# Patient Record
Sex: MALE | Race: WHITE | NOT HISPANIC OR LATINO | Employment: UNEMPLOYED | URBAN - METROPOLITAN AREA
[De-identification: names, ages, dates, MRNs, and addresses within clinical notes are randomized per-mention and may not be internally consistent; named-entity substitution may affect disease eponyms.]

---

## 2023-04-25 ENCOUNTER — APPOINTMENT (OUTPATIENT)
Dept: PRIMARY CARE | Facility: CLINIC | Age: 38
End: 2023-04-25
Payer: COMMERCIAL

## 2023-05-03 ENCOUNTER — APPOINTMENT (OUTPATIENT)
Dept: LAB | Facility: LAB | Age: 38
End: 2023-05-03
Payer: COMMERCIAL

## 2023-05-03 ENCOUNTER — LAB (OUTPATIENT)
Dept: LAB | Facility: LAB | Age: 38
End: 2023-05-03
Payer: COMMERCIAL

## 2023-05-03 ENCOUNTER — OFFICE VISIT (OUTPATIENT)
Dept: PRIMARY CARE | Facility: CLINIC | Age: 38
End: 2023-05-03
Payer: COMMERCIAL

## 2023-05-03 VITALS
HEIGHT: 75 IN | HEART RATE: 83 BPM | SYSTOLIC BLOOD PRESSURE: 118 MMHG | WEIGHT: 148 LBS | BODY MASS INDEX: 18.4 KG/M2 | DIASTOLIC BLOOD PRESSURE: 78 MMHG | OXYGEN SATURATION: 96 %

## 2023-05-03 DIAGNOSIS — E55.9 VITAMIN D DEFICIENCY: ICD-10-CM

## 2023-05-03 DIAGNOSIS — Z00.00 WELLNESS EXAMINATION: ICD-10-CM

## 2023-05-03 DIAGNOSIS — E50.9 VITAMIN A DEFICIENCY: ICD-10-CM

## 2023-05-03 DIAGNOSIS — Z00.00 WELLNESS EXAMINATION: Primary | ICD-10-CM

## 2023-05-03 DIAGNOSIS — M54.41 CHRONIC BILATERAL LOW BACK PAIN WITH BILATERAL SCIATICA: ICD-10-CM

## 2023-05-03 DIAGNOSIS — G89.29 CHRONIC BILATERAL LOW BACK PAIN WITH BILATERAL SCIATICA: ICD-10-CM

## 2023-05-03 DIAGNOSIS — M54.42 CHRONIC BILATERAL LOW BACK PAIN WITH BILATERAL SCIATICA: ICD-10-CM

## 2023-05-03 PROBLEM — S29.011A INTERCOSTAL MUSCLE STRAIN: Status: ACTIVE | Noted: 2023-05-03

## 2023-05-03 LAB
ALANINE AMINOTRANSFERASE (SGPT) (U/L) IN SER/PLAS: 28 U/L (ref 10–52)
ALBUMIN (G/DL) IN SER/PLAS: 4.3 G/DL (ref 3.4–5)
ALKALINE PHOSPHATASE (U/L) IN SER/PLAS: 67 U/L (ref 33–120)
ANION GAP IN SER/PLAS: 11 MMOL/L (ref 10–20)
ASPARTATE AMINOTRANSFERASE (SGOT) (U/L) IN SER/PLAS: 29 U/L (ref 9–39)
BILIRUBIN TOTAL (MG/DL) IN SER/PLAS: 0.6 MG/DL (ref 0–1.2)
CALCIUM (MG/DL) IN SER/PLAS: 9 MG/DL (ref 8.6–10.3)
CARBON DIOXIDE, TOTAL (MMOL/L) IN SER/PLAS: 33 MMOL/L (ref 21–32)
CHLORIDE (MMOL/L) IN SER/PLAS: 103 MMOL/L (ref 98–107)
CHOLESTEROL (MG/DL) IN SER/PLAS: 101 MG/DL (ref 0–199)
CHOLESTEROL IN HDL (MG/DL) IN SER/PLAS: 46.4 MG/DL
CHOLESTEROL/HDL RATIO: 2.2
CREATININE (MG/DL) IN SER/PLAS: 0.99 MG/DL (ref 0.5–1.3)
ERYTHROCYTE DISTRIBUTION WIDTH (RATIO) BY AUTOMATED COUNT: 12.7 % (ref 11.5–14.5)
ERYTHROCYTE MEAN CORPUSCULAR HEMOGLOBIN CONCENTRATION (G/DL) BY AUTOMATED: 32.5 G/DL (ref 32–36)
ERYTHROCYTE MEAN CORPUSCULAR VOLUME (FL) BY AUTOMATED COUNT: 99 FL (ref 80–100)
ERYTHROCYTES (10*6/UL) IN BLOOD BY AUTOMATED COUNT: 5.02 X10E12/L (ref 4.5–5.9)
GFR MALE: >90 ML/MIN/1.73M2
GLUCOSE (MG/DL) IN SER/PLAS: 62 MG/DL (ref 74–99)
HEMATOCRIT (%) IN BLOOD BY AUTOMATED COUNT: 49.8 % (ref 41–52)
HEMOGLOBIN (G/DL) IN BLOOD: 16.2 G/DL (ref 13.5–17.5)
LEUKOCYTES (10*3/UL) IN BLOOD BY AUTOMATED COUNT: 7.1 X10E9/L (ref 4.4–11.3)
NON-HDL CHOLESTEROL: 55 MG/DL
PLATELETS (10*3/UL) IN BLOOD AUTOMATED COUNT: 163 X10E9/L (ref 150–450)
POTASSIUM (MMOL/L) IN SER/PLAS: 3.9 MMOL/L (ref 3.5–5.3)
PROTEIN TOTAL: 6.2 G/DL (ref 6.4–8.2)
SODIUM (MMOL/L) IN SER/PLAS: 143 MMOL/L (ref 136–145)
THYROTROPIN (MIU/L) IN SER/PLAS BY DETECTION LIMIT <= 0.05 MIU/L: 1.7 MIU/L (ref 0.44–3.98)
UREA NITROGEN (MG/DL) IN SER/PLAS: 17 MG/DL (ref 6–23)

## 2023-05-03 PROCEDURE — 1036F TOBACCO NON-USER: CPT

## 2023-05-03 PROCEDURE — 83718 ASSAY OF LIPOPROTEIN: CPT

## 2023-05-03 PROCEDURE — 84443 ASSAY THYROID STIM HORMONE: CPT

## 2023-05-03 PROCEDURE — 84153 ASSAY OF PSA TOTAL: CPT

## 2023-05-03 PROCEDURE — 80053 COMPREHEN METABOLIC PANEL: CPT

## 2023-05-03 PROCEDURE — 85027 COMPLETE CBC AUTOMATED: CPT

## 2023-05-03 PROCEDURE — 99386 PREV VISIT NEW AGE 40-64: CPT

## 2023-05-03 PROCEDURE — 82465 ASSAY BLD/SERUM CHOLESTEROL: CPT

## 2023-05-03 PROCEDURE — 99213 OFFICE O/P EST LOW 20 MIN: CPT

## 2023-05-03 PROCEDURE — 82306 VITAMIN D 25 HYDROXY: CPT

## 2023-05-03 PROCEDURE — 36415 COLL VENOUS BLD VENIPUNCTURE: CPT

## 2023-05-03 RX ORDER — MELOXICAM 15 MG/1
15 TABLET ORAL DAILY
Qty: 30 TABLET | Refills: 1 | Status: SHIPPED | OUTPATIENT
Start: 2023-05-03 | End: 2023-06-29

## 2023-05-03 RX ORDER — METHOCARBAMOL 500 MG/1
500 TABLET, FILM COATED ORAL 4 TIMES DAILY
Qty: 120 TABLET | Refills: 2 | Status: SHIPPED | OUTPATIENT
Start: 2023-05-03 | End: 2024-05-02

## 2023-05-03 RX ORDER — MELOXICAM 15 MG/1
15 TABLET ORAL DAILY
COMMUNITY
End: 2023-05-03 | Stop reason: SDUPTHER

## 2023-05-03 RX ORDER — METHOCARBAMOL 500 MG/1
500 TABLET, FILM COATED ORAL 4 TIMES DAILY
COMMUNITY
End: 2023-05-03 | Stop reason: SDUPTHER

## 2023-05-03 ASSESSMENT — PROMIS GLOBAL HEALTH SCALE
RATE_AVERAGE_PAIN: 4
CARRYOUT_SOCIAL_ACTIVITIES: VERY GOOD
EMOTIONAL_PROBLEMS: SOMETIMES
CARRYOUT_PHYSICAL_ACTIVITIES: COMPLETELY
RATE_GENERAL_HEALTH: GOOD
RATE_MENTAL_HEALTH: GOOD
RATE_PHYSICAL_HEALTH: GOOD
RATE_QUALITY_OF_LIFE: GOOD
RATE_AVERAGE_FATIGUE: MILD
RATE_SOCIAL_SATISFACTION: FAIR

## 2023-05-03 ASSESSMENT — ENCOUNTER SYMPTOMS
CHILLS: 0
SORE THROAT: 0
CONSTIPATION: 0
JOINT SWELLING: 0
ABDOMINAL PAIN: 0
HEADACHES: 0
SINUS PAIN: 0
MYALGIAS: 0
NECK PAIN: 0
WHEEZING: 0
EYE DISCHARGE: 0
BOWEL INCONTINENCE: 0
BACK PAIN: 1
HYPERACTIVE: 0
DIFFICULTY URINATING: 0
PALPITATIONS: 0
BLOOD IN STOOL: 0
AGITATION: 0
WEAKNESS: 0
SINUS PRESSURE: 0
EYE PAIN: 0
FEVER: 0
VOICE CHANGE: 0
NERVOUS/ANXIOUS: 0
WEIGHT LOSS: 0
TINGLING: 0
WOUND: 0
SHORTNESS OF BREATH: 0
DIARRHEA: 0
RHINORRHEA: 0
NUMBNESS: 0
PARESTHESIAS: 0
DYSURIA: 0
SPEECH DIFFICULTY: 0
FREQUENCY: 0
PERIANAL NUMBNESS: 0
LEG PAIN: 0
HEMATURIA: 0
PARESIS: 0
DIZZINESS: 0
VOMITING: 0
EYE ITCHING: 0
SLEEP DISTURBANCE: 0
COUGH: 0
CHEST TIGHTNESS: 0
FATIGUE: 0
DYSPHORIC MOOD: 0

## 2023-05-03 ASSESSMENT — PATIENT HEALTH QUESTIONNAIRE - PHQ9
SUM OF ALL RESPONSES TO PHQ9 QUESTIONS 1 AND 2: 0
2. FEELING DOWN, DEPRESSED OR HOPELESS: NOT AT ALL
1. LITTLE INTEREST OR PLEASURE IN DOING THINGS: NOT AT ALL

## 2023-05-03 NOTE — PROGRESS NOTES
Subjective   Patient ID: Brandin Spann is a 38 y.o. male who presents for Establish Care (Pt is here today to establish care,  states he injured his lower back two years ago and the pain is recurring was told it was arthritis and would like to discuss and look at x-rays ).    Subjective  Brandin Spann is a 38 y.o. male and is here for a comprehensive physical exam. The patient reports chronic back pain.    Do you take any herbs or supplements that were not prescribed by a doctor? no  Are you taking calcium supplements? no  Are you taking aspirin daily? no       1. Well visits  2. Patient Counseling:  --Nutrition: Stressed importance of moderation in sodium/caffeine intake, saturated fat and cholesterol, caloric balance, sufficient intake of fresh fruits, vegetables, fiber, calcium, iron, and 1 mg of folate supplement per day (for females capable of pregnancy).  --Discussed the issue of estrogen replacement, calcium supplement, and the daily use of baby aspirin.  --Exercise: Stressed the importance of regular exercise.   --Substance Abuse: Discussed cessation/primary prevention of tobacco, alcohol, or other drug use; driving or other dangerous activities under the influence; availability of treatment for abuse.    --Sexuality: Discussed sexually transmitted diseases, partner selection, use of condoms, avoidance of unintended pregnancy  and contraceptive alternatives.   --Injury prevention: Discussed safety belts, safety helmets, smoke detector, smoking near bedding or upholstery.   --Dental health: Discussed importance of regular tooth brushing, flossing, and dental visits.  --Immunizations reviewed.  --Discussed benefits of screening colonoscopy.  --After hours service discussed with patient  3. Discussed the patient's BMI with him.  The BMI is in the acceptable range.  4. Follow up as needed for acute illness      Back Pain  This is a chronic (pt fell on ice and has disk with xrays from original ED visit with  him.  Has seen spinal specialist but did not feel they were affective in communicating the treatment plan.  Pt now has better understanding. will continue tx plan) problem. The current episode started more than 1 month ago. The problem occurs intermittently. The problem has been waxing and waning since onset. The pain is present in the lumbar spine. The quality of the pain is described as aching. The pain is at a severity of 7/10. The pain is moderate. The pain is Worse during the night. Pertinent negatives include no abdominal pain, bladder incontinence, bowel incontinence, chest pain, dysuria, fever, headaches, leg pain, numbness, paresis, paresthesias, pelvic pain, perianal numbness, tingling, weakness or weight loss. Risk factors include recent trauma. He has tried muscle relaxant and NSAIDs for the symptoms. The treatment provided moderate relief.        Review of Systems   Constitutional:  Negative for chills, fatigue, fever and weight loss.   HENT:  Negative for congestion, ear discharge, ear pain, nosebleeds, postnasal drip, rhinorrhea, sinus pressure, sinus pain, sore throat, tinnitus and voice change.    Eyes:  Negative for pain, discharge and itching.   Respiratory:  Negative for cough, chest tightness, shortness of breath and wheezing.    Cardiovascular:  Negative for chest pain, palpitations and leg swelling.   Gastrointestinal:  Negative for abdominal pain, blood in stool, bowel incontinence, constipation, diarrhea and vomiting.   Endocrine: Negative for cold intolerance, heat intolerance and polyuria.   Genitourinary:  Negative for bladder incontinence, difficulty urinating, dysuria, frequency, hematuria, pelvic pain, penile pain, penile swelling, scrotal swelling, testicular pain and urgency.   Musculoskeletal:  Positive for back pain. Negative for gait problem, joint swelling, myalgias and neck pain.   Skin:  Negative for rash and wound.   Neurological:  Negative for dizziness, tingling, speech  "difficulty, weakness, numbness, headaches and paresthesias.   Psychiatric/Behavioral:  Negative for agitation, dysphoric mood and sleep disturbance. The patient is not nervous/anxious and is not hyperactive.        Objective   /78   Pulse 83   Ht 1.905 m (6' 3\")   Wt 67.1 kg (148 lb)   SpO2 96%   BMI 18.50 kg/m²     Physical Exam  Vitals and nursing note reviewed.   Constitutional:       Appearance: Normal appearance.   HENT:      Head: Normocephalic and atraumatic.      Right Ear: Tympanic membrane and external ear normal.      Left Ear: Tympanic membrane and external ear normal.      Nose: Nose normal.      Mouth/Throat:      Mouth: Mucous membranes are moist.      Pharynx: Oropharynx is clear. Uvula midline.   Eyes:      General: Lids are normal.      Extraocular Movements: Extraocular movements intact.      Pupils: Pupils are equal, round, and reactive to light.   Neck:      Thyroid: No thyromegaly or thyroid tenderness.   Cardiovascular:      Rate and Rhythm: Normal rate and regular rhythm.      Heart sounds: Normal heart sounds.   Pulmonary:      Effort: Pulmonary effort is normal.      Breath sounds: Normal breath sounds.   Abdominal:      General: Bowel sounds are normal.      Palpations: Abdomen is soft.      Tenderness: There is no abdominal tenderness. There is no guarding.   Musculoskeletal:         General: No swelling. Normal range of motion.      Cervical back: Normal range of motion.      Right lower leg: No edema.      Left lower leg: No edema.   Lymphadenopathy:      Head:      Right side of head: No submental, submandibular or tonsillar adenopathy.      Left side of head: No submental, submandibular or tonsillar adenopathy.      Cervical: No cervical adenopathy.   Skin:     General: Skin is warm and dry.      Capillary Refill: Capillary refill takes less than 2 seconds.      Coloration: Skin is not jaundiced.      Findings: No lesion or rash.   Neurological:      General: No focal " deficit present.      Mental Status: He is alert and oriented to person, place, and time.   Psychiatric:         Mood and Affect: Mood normal.         Behavior: Behavior normal.         Thought Content: Thought content normal.         Judgment: Judgment normal.         Assessment/Plan   Problem List Items Addressed This Visit          Other    Chronic bilateral low back pain with bilateral sciatica - Primary    Relevant Medications    methocarbamol (Robaxin) 500 mg tablet    meloxicam (Mobic) 15 mg tablet    Other Relevant Orders    Referral to Physical Therapy     Other Visit Diagnoses       Wellness examination        Relevant Orders    Comprehensive Metabolic Panel    CBC    TSH with reflex to Free T4 if abnormal    Lipid Panel Non-Fasting    Vitamin A deficiency        Relevant Orders    Vitamin D, Total

## 2023-05-03 NOTE — PATIENT INSTRUCTIONS
Please get fasting labs done in the next few DAYS (nothing to eat or drink for 10 hours prior to blood draw EXCEPT black coffee and water), we will call you with the results. If you do not hear from up 2-3 business days after you had your labs drawn, please call the office at [phone]    Vaccines are important!  Yearly seasonal flu shots are recommended, high dose is indicated for patient over 65.   - Tetanus boosters should be given every 10 yrs, this also covers for diphtheria and pertussis.   - most insurances cover the 2-shot series for shingles (shingrix) after the age of 60.   - Pneumonia vaccines are given routinely at age 65, however is you have a chronic heart or lung diagnosis this may be given earlier.     Preventative cancer screens SAVE LIVES!  - Prostate cancer screening is included in yearly blood work in men over 40.   - Colon cancer screening is recommended at age  for all patients, sometimes sooner based on family history.   - other tests may be recommended if you are a smoker!     150 mins of aerobic exercise is advised for good cardiovascular health and maintain a healthy weight.     Please try to work on maintaining a healthy body weight, with a BMI close to or at a BMI 19-25.    Recommend a whole-foods, plant-based diet, filled with fresh fruits, vegetables, seeds, beans, nuts and berries.    Discussed smoking cessation/Abstinence is best.      Abstaining from the use of alcohol is best. However if you choose to drink current guidelines are 2 or less drinks per day for men and 1.5 or less per day for women (and not all saved for one night on the weekend).    Your blood pressure should be BELOW 135/85.  If your readings were high today, please consider an at home blood pressure monitor to keep a log to bring with you to your next appointment.     OF course, do not text and drive and always wear a seat belt.

## 2023-05-04 LAB
CALCIDIOL (25 OH VITAMIN D3) (NG/ML) IN SER/PLAS: 36 NG/ML
PROSTATE SPECIFIC ANTIGEN,SCREEN: 0.52 NG/ML (ref 0–4)

## 2024-08-13 ENCOUNTER — APPOINTMENT (OUTPATIENT)
Dept: PRIMARY CARE | Facility: CLINIC | Age: 39
End: 2024-08-13
Payer: COMMERCIAL

## 2024-08-13 VITALS
DIASTOLIC BLOOD PRESSURE: 75 MMHG | HEIGHT: 75 IN | SYSTOLIC BLOOD PRESSURE: 124 MMHG | WEIGHT: 150 LBS | BODY MASS INDEX: 18.65 KG/M2 | HEART RATE: 77 BPM | OXYGEN SATURATION: 98 %

## 2024-08-13 DIAGNOSIS — Z02.89 MEDICATION MANAGEMENT CONTRACT SIGNED: ICD-10-CM

## 2024-08-13 DIAGNOSIS — F90.0 ADHD, PREDOMINANTLY INATTENTIVE TYPE: Primary | ICD-10-CM

## 2024-08-13 DIAGNOSIS — N52.9 VASCULOGENIC ERECTILE DYSFUNCTION, UNSPECIFIED VASCULOGENIC ERECTILE DYSFUNCTION TYPE: ICD-10-CM

## 2024-08-13 LAB
POC AMPHETAMINE: NEGATIVE NG/ML
POC BARBITURATES: NEGATIVE NG/ML
POC BENZODIAZEPINES: NEGATIVE NG/ML
POC BUPRENORPHINE URINE: NEGATIVE NG/ML
POC COCAINE: NEGATIVE NG/ML
POC MDMA (NG/ML) IN URINE: NEGATIVE NG/ML
POC METHADONE MANUALLY ENTERED: NEGATIVE NG/ML
POC METHAMPHETAMINE: NEGATIVE NG/ML
POC MORPHINE URINE: NEGATIVE NG/ML
POC OPIATES: NEGATIVE NG/ML
POC OXYCODONE: NEGATIVE NG/ML
POC PHENCYCLIDINE (PCP): NEGATIVE NG/ML
POC THC: NEGATIVE NG/ML
POC TICYCLIC ANTIDEPRESSANTS (TCA): NEGATIVE NG/ML

## 2024-08-13 PROCEDURE — 80305 DRUG TEST PRSMV DIR OPT OBS: CPT

## 2024-08-13 PROCEDURE — 3008F BODY MASS INDEX DOCD: CPT

## 2024-08-13 PROCEDURE — 99214 OFFICE O/P EST MOD 30 MIN: CPT

## 2024-08-13 PROCEDURE — 1036F TOBACCO NON-USER: CPT

## 2024-08-13 RX ORDER — TADALAFIL 10 MG/1
10 TABLET ORAL DAILY PRN
Qty: 12 TABLET | Refills: 3 | Status: SHIPPED | OUTPATIENT
Start: 2024-08-13 | End: 2025-08-13

## 2024-08-13 RX ORDER — DEXTROAMPHETAMINE SACCHARATE, AMPHETAMINE ASPARTATE MONOHYDRATE, DEXTROAMPHETAMINE SULFATE AND AMPHETAMINE SULFATE 5; 5; 5; 5 MG/1; MG/1; MG/1; MG/1
20 CAPSULE, EXTENDED RELEASE ORAL EVERY MORNING
Qty: 30 CAPSULE | Refills: 0 | Status: SHIPPED | OUTPATIENT
Start: 2024-10-12 | End: 2024-11-10

## 2024-08-13 RX ORDER — DEXTROAMPHETAMINE SACCHARATE, AMPHETAMINE ASPARTATE MONOHYDRATE, DEXTROAMPHETAMINE SULFATE AND AMPHETAMINE SULFATE 2.5; 2.5; 2.5; 2.5 MG/1; MG/1; MG/1; MG/1
10 CAPSULE, EXTENDED RELEASE ORAL EVERY MORNING
Qty: 30 CAPSULE | Refills: 0 | Status: SHIPPED | OUTPATIENT
Start: 2024-08-13 | End: 2024-09-12

## 2024-08-13 RX ORDER — DEXTROAMPHETAMINE SACCHARATE, AMPHETAMINE ASPARTATE MONOHYDRATE, DEXTROAMPHETAMINE SULFATE AND AMPHETAMINE SULFATE 5; 5; 5; 5 MG/1; MG/1; MG/1; MG/1
20 CAPSULE, EXTENDED RELEASE ORAL EVERY MORNING
Qty: 30 CAPSULE | Refills: 0 | Status: SHIPPED | OUTPATIENT
Start: 2024-08-13 | End: 2024-08-13 | Stop reason: DRUGHIGH

## 2024-08-13 RX ORDER — DEXTROAMPHETAMINE SACCHARATE, AMPHETAMINE ASPARTATE MONOHYDRATE, DEXTROAMPHETAMINE SULFATE AND AMPHETAMINE SULFATE 5; 5; 5; 5 MG/1; MG/1; MG/1; MG/1
20 CAPSULE, EXTENDED RELEASE ORAL EVERY MORNING
Qty: 30 CAPSULE | Refills: 0 | Status: SHIPPED | OUTPATIENT
Start: 2024-09-12 | End: 2024-10-11

## 2024-08-13 ASSESSMENT — ENCOUNTER SYMPTOMS
HEMATURIA: 0
NERVOUS/ANXIOUS: 0
FREQUENCY: 0
DYSURIA: 0
DECREASED LIBIDO: 0

## 2024-08-13 NOTE — PROGRESS NOTES
Subjective   Patient ID: Brandin Spann is a 39 y.o. male who presents for Follow-up (pt to go back on his meds for adhd and would like to discuss meds for erectile dysfunction).    OARRS:  No data recorded  I have personally reviewed the OARRS report for Brandin Spann. I have considered the risks of abuse, dependence, addiction and diversion and I believe that it is clinically appropriate for Brandin Spann to be prescribed this medication    Is the patient prescribed a combination of a benzodiazepine and opioid?  No    Last Urine Drug Screen / ordered today: Yes Date of Last UDS: 8/13/24  Results are as expected.     Controlled Substance Agreement:  Date of the Last Agreement: 8/13/24  Date of the Last fill: n/a new script  Reviewed Controlled Substance Agreement including but not limited to the benefits, risks, and alternatives to treatment with a Controlled Substance medication(s).      ADHD  This is a chronic problem. The current episode started more than 1 year ago (ritalin starting in approx 3rd grade). The problem occurs daily. The problem has been gradually worsening. Associated symptoms comments: Hard to focus, not completing tasks. The symptoms are aggravated by stress. Treatments tried: vyvanse and adderall while he was in [place].   Erectile Dysfunction  This is a chronic problem. The current episode started more than 1 month ago. The problem has been gradually worsening since onset. The nature of his difficulty is achieving erection. Non-physiologic factors contributing to erectile dysfunction are performance anxiety. He reports no anxiety or decreased libido. He reports his erection duration to be 5 to 10 minutes. Irritative symptoms include nocturia. Irritative symptoms do not include frequency or urgency. Obstructive symptoms do not include dribbling, incomplete emptying, an intermittent stream, a slower stream, straining or a weak stream. Pertinent negatives include no dysuria, genital pain,  "hematuria, hesitancy or inability to urinate. Nothing aggravates the symptoms. Treatments tried: blue chew. The treatment provided no relief. Risk factors include diabetes mellitus, penile trauma, penile curvature, prostate surgery and nerve injury.        Review of Systems   Genitourinary:  Positive for nocturia. Negative for decreased libido, dysuria, frequency, hematuria, hesitancy, incomplete emptying and urgency.   Psychiatric/Behavioral:  The patient is not nervous/anxious.        Objective   /75   Pulse 77   Ht 1.905 m (6' 3\")   Wt 68 kg (150 lb)   SpO2 98%   BMI 18.75 kg/m²     Physical Exam  Vitals reviewed.   Constitutional:       General: He is not in acute distress.     Appearance: Normal appearance. He is not ill-appearing.   Cardiovascular:      Heart sounds: Normal heart sounds.   Pulmonary:      Breath sounds: Normal breath sounds.   Abdominal:      General: Bowel sounds are normal.      Palpations: Abdomen is soft.   Neurological:      Mental Status: He is alert.   Psychiatric:         Mood and Affect: Mood normal.         Behavior: Behavior normal.         Assessment/Plan   Brandin was seen today for follow-up.  Diagnoses and all orders for this visit:  ADHD, predominantly inattentive type  -     amphetamine-dextroamphetamine XR (Adderall XR) 20 mg 24 hr capsule; Take 1 capsule (20 mg) by mouth once daily in the morning. Do not crush or chew.  -     amphetamine-dextroamphetamine XR (Adderall XR) 20 mg 24 hr capsule; Take 1 capsule (20 mg) by mouth once daily in the morning. Do not crush or chew. Do not fill before September 12, 2024.  -     amphetamine-dextroamphetamine XR (Adderall XR) 20 mg 24 hr capsule; Take 1 capsule (20 mg) by mouth once daily in the morning. Do not crush or chew. Do not fill before October 12, 2024.  Medication management contract signed  -     POCT waived urine drug screen manually resulted  Vasculogenic erectile dysfunction, unspecified vasculogenic erectile " dysfunction type  -     tadalafil (Cialis) 10 mg tablet; Take 1 tablet (10 mg) by mouth once daily as needed for erectile dysfunction.    1st month of adderal changed to 10mg due to availability pt aware of change.

## 2024-09-19 ENCOUNTER — TELEPHONE (OUTPATIENT)
Dept: PRIMARY CARE | Facility: CLINIC | Age: 39
End: 2024-09-19
Payer: COMMERCIAL

## 2024-09-19 DIAGNOSIS — F90.0 ADHD, PREDOMINANTLY INATTENTIVE TYPE: ICD-10-CM

## 2024-09-19 RX ORDER — DEXTROAMPHETAMINE SACCHARATE, AMPHETAMINE ASPARTATE MONOHYDRATE, DEXTROAMPHETAMINE SULFATE AND AMPHETAMINE SULFATE 2.5; 2.5; 2.5; 2.5 MG/1; MG/1; MG/1; MG/1
20 CAPSULE, EXTENDED RELEASE ORAL EVERY MORNING
Qty: 60 CAPSULE | Refills: 0 | Status: SHIPPED | OUTPATIENT
Start: 2024-09-19 | End: 2024-09-19 | Stop reason: SDUPTHER

## 2024-09-19 RX ORDER — DEXTROAMPHETAMINE SACCHARATE, AMPHETAMINE ASPARTATE MONOHYDRATE, DEXTROAMPHETAMINE SULFATE AND AMPHETAMINE SULFATE 2.5; 2.5; 2.5; 2.5 MG/1; MG/1; MG/1; MG/1
20 CAPSULE, EXTENDED RELEASE ORAL EVERY MORNING
Qty: 60 CAPSULE | Refills: 0 | Status: SHIPPED | OUTPATIENT
Start: 2024-09-19 | End: 2024-10-19

## 2024-09-19 NOTE — TELEPHONE ENCOUNTER
Can we call him and let him know I sent script for 10mg ER and to take 2 per day as you can just take 20 of the non extended they do not stay in the system the same. If there is still availability issues we can go to the regular 10mg twice per day once in am and once at lunch time

## 2024-09-19 NOTE — TELEPHONE ENCOUNTER
Patient called in stating he has called 2x and hasn't heard back. States his adderall 20mg time release is on pack order, has called several pharmacies and all say the same thing. Was wondering if you could prescribe him the standard 20mg instead. States he was suppose get script refill 9/11 and is now out of medication.

## 2024-10-16 ENCOUNTER — TELEPHONE (OUTPATIENT)
Dept: PRIMARY CARE | Facility: CLINIC | Age: 39
End: 2024-10-16
Payer: COMMERCIAL

## 2024-10-16 NOTE — TELEPHONE ENCOUNTER
Patients insurance wont cover 10 mg 2 times a day. Would like to switch to 20mg once a day if possible.    Last office visit: 8/13/2024  Next office visit: 2/11/2025

## 2025-01-23 DIAGNOSIS — F90.0 ADHD, PREDOMINANTLY INATTENTIVE TYPE: ICD-10-CM

## 2025-01-23 RX ORDER — DEXTROAMPHETAMINE SACCHARATE, AMPHETAMINE ASPARTATE MONOHYDRATE, DEXTROAMPHETAMINE SULFATE AND AMPHETAMINE SULFATE 5; 5; 5; 5 MG/1; MG/1; MG/1; MG/1
20 CAPSULE, EXTENDED RELEASE ORAL EVERY MORNING
Qty: 30 CAPSULE | Refills: 0 | Status: SHIPPED | OUTPATIENT
Start: 2025-01-23 | End: 2025-02-21

## 2025-01-23 NOTE — TELEPHONE ENCOUNTER
OARRS:  No data recorded  I have personally reviewed the OARRS report for Brandin Spann. I have considered the risks of abuse, dependence, addiction and diversion and I believe that it is clinically appropriate for Brandin Spann to be prescribed this medication    Is the patient prescribed a combination of a benzodiazepine and opioid?  No    Last Urine Drug Screen / ordered today: No Date of Last UDS: 8/2024  Results are as expected.     Controlled Substance Agreement:  Date of the Last Agreement: 8/2024  Date of the Last fill: 12/26/24  Reviewed Controlled Substance Agreement including but not limited to the benefits, risks, and alternatives to treatment with a Controlled Substance medication(s).    Stimulants:   What is the patient's goal of therapy? Control of adhd  Is this being achieved with current treatment? yes

## 2025-02-11 ENCOUNTER — APPOINTMENT (OUTPATIENT)
Dept: PRIMARY CARE | Facility: CLINIC | Age: 40
End: 2025-02-11
Payer: COMMERCIAL

## 2025-02-11 VITALS
HEIGHT: 75 IN | BODY MASS INDEX: 19.27 KG/M2 | SYSTOLIC BLOOD PRESSURE: 134 MMHG | OXYGEN SATURATION: 99 % | HEART RATE: 75 BPM | WEIGHT: 155 LBS | DIASTOLIC BLOOD PRESSURE: 80 MMHG

## 2025-02-11 DIAGNOSIS — Z02.89 MEDICATION MANAGEMENT CONTRACT SIGNED: ICD-10-CM

## 2025-02-11 DIAGNOSIS — F90.0 ADHD, PREDOMINANTLY INATTENTIVE TYPE: Primary | ICD-10-CM

## 2025-02-11 PROCEDURE — 1036F TOBACCO NON-USER: CPT

## 2025-02-11 PROCEDURE — 99213 OFFICE O/P EST LOW 20 MIN: CPT

## 2025-02-11 PROCEDURE — 3008F BODY MASS INDEX DOCD: CPT

## 2025-02-11 RX ORDER — DEXTROAMPHETAMINE SACCHARATE, AMPHETAMINE ASPARTATE, DEXTROAMPHETAMINE SULFATE AND AMPHETAMINE SULFATE 5; 5; 5; 5 MG/1; MG/1; MG/1; MG/1
20 TABLET ORAL DAILY
Qty: 30 TABLET | Refills: 0 | Status: SHIPPED | OUTPATIENT
Start: 2025-03-13 | End: 2025-04-12

## 2025-02-11 RX ORDER — DEXTROAMPHETAMINE SACCHARATE, AMPHETAMINE ASPARTATE, DEXTROAMPHETAMINE SULFATE AND AMPHETAMINE SULFATE 5; 5; 5; 5 MG/1; MG/1; MG/1; MG/1
20 TABLET ORAL DAILY
Qty: 30 TABLET | Refills: 0 | Status: SHIPPED | OUTPATIENT
Start: 2025-02-11 | End: 2025-03-13

## 2025-02-11 RX ORDER — DEXTROAMPHETAMINE SACCHARATE, AMPHETAMINE ASPARTATE, DEXTROAMPHETAMINE SULFATE AND AMPHETAMINE SULFATE 5; 5; 5; 5 MG/1; MG/1; MG/1; MG/1
20 TABLET ORAL DAILY
Qty: 30 TABLET | Refills: 0 | Status: SHIPPED | OUTPATIENT
Start: 2025-04-12 | End: 2025-05-12

## 2025-02-11 ASSESSMENT — PATIENT HEALTH QUESTIONNAIRE - PHQ9
SUM OF ALL RESPONSES TO PHQ9 QUESTIONS 1 AND 2: 0
1. LITTLE INTEREST OR PLEASURE IN DOING THINGS: NOT AT ALL
2. FEELING DOWN, DEPRESSED OR HOPELESS: NOT AT ALL

## 2025-02-11 NOTE — PROGRESS NOTES
"Subjective   Patient ID: Brandin Spann is a 39 y.o. male who presents for Follow-up (6 month FUV for Controlled/UDS 8/13/24/CSA 8/13/24).    OARRS:  No data recorded  I have personally reviewed the OARRS report for Brandin Spann. I have considered the risks of abuse, dependence, addiction and diversion and I believe that it is clinically appropriate for Brandin Spann to be prescribed this medication    Is the patient prescribed a combination of a benzodiazepine and opioid?  No    Last Urine Drug Screen / ordered today: No Date of Last UDS: 8/13/24  Results are as expected.     Controlled Substance Agreement:  Date of the Last Agreement: 8/13/24  Date of the Last fill: 1/24/25  Reviewed Controlled Substance Agreement including but not limited to the benefits, risks, and alternatives to treatment with a Controlled Substance medication(s).    Stimulants:   What is the patient's goal of therapy? Concentrate better  Is this being achieved with current treatment? yes    Activities of Daily Living:   Is your overall impression that this patient is benefiting (symptom reduction outweighs side effects) from stimulant therapy? Yes     1. Physical Functioning: Better  2. Family Relationship: Better  3. Social Relationship: Better  4. Mood: Better  5. Sleep Patterns: feels xr is affecting his sleep, would like to change to immediate release so that med is done when work day is over.  6. Overall Function: Better            ADHD  This is a chronic problem. The current episode started more than 1 month ago. The problem occurs daily. The problem has been unchanged (well controlled with current medication). Nothing aggravates the symptoms. Treatments tried: stimmulant extended release. The treatment provided moderate relief.        Review of Systems    Objective   /80   Pulse 75   Ht 1.905 m (6' 3\")   Wt 70.3 kg (155 lb)   SpO2 99%   BMI 19.37 kg/m²     Physical Exam  Vitals reviewed.   Constitutional:       " General: He is not in acute distress.     Appearance: Normal appearance. He is not ill-appearing.   Cardiovascular:      Heart sounds: Normal heart sounds.   Pulmonary:      Effort: Pulmonary effort is normal.      Breath sounds: Normal breath sounds.   Neurological:      Mental Status: He is alert and oriented to person, place, and time.         Assessment/Plan   Brandin was seen today for follow-up.  Diagnoses and all orders for this visit:  ADHD, predominantly inattentive type  -     amphetamine-dextroamphetamine (Adderall) 20 mg tablet; Take 1 tablet (20 mg) by mouth once daily.  -     amphetamine-dextroamphetamine (Adderall) 20 mg tablet; Take 1 tablet (20 mg) by mouth once daily. Do not fill before March 13, 2025.  -     amphetamine-dextroamphetamine (Adderall) 20 mg tablet; Take 1 tablet (20 mg) by mouth once daily. Do not fill before April 12, 2025.  Medication management contract signed

## 2025-03-12 DIAGNOSIS — F90.0 ADHD, PREDOMINANTLY INATTENTIVE TYPE: ICD-10-CM

## 2025-03-12 RX ORDER — DEXTROAMPHETAMINE SACCHARATE, AMPHETAMINE ASPARTATE, DEXTROAMPHETAMINE SULFATE AND AMPHETAMINE SULFATE 5; 5; 5; 5 MG/1; MG/1; MG/1; MG/1
20 TABLET ORAL DAILY
Qty: 30 TABLET | Refills: 0 | Status: CANCELLED | OUTPATIENT
Start: 2025-03-12 | End: 2025-04-11

## 2025-05-16 DIAGNOSIS — F90.0 ADHD, PREDOMINANTLY INATTENTIVE TYPE: ICD-10-CM

## 2025-05-16 RX ORDER — DEXTROAMPHETAMINE SACCHARATE, AMPHETAMINE ASPARTATE, DEXTROAMPHETAMINE SULFATE AND AMPHETAMINE SULFATE 5; 5; 5; 5 MG/1; MG/1; MG/1; MG/1
20 TABLET ORAL DAILY
Qty: 30 TABLET | Refills: 0 | Status: SHIPPED | OUTPATIENT
Start: 2025-05-16 | End: 2025-06-15

## 2025-05-16 NOTE — TELEPHONE ENCOUNTER
Last office visit: 2/11/2025  Next office visit: 8/13/2025  UDS 8/13/24  CSA 8/13/24  Fill 4/13/25

## 2025-05-31 ENCOUNTER — PATIENT MESSAGE (OUTPATIENT)
Dept: PRIMARY CARE | Facility: CLINIC | Age: 40
End: 2025-05-31
Payer: COMMERCIAL

## 2025-05-31 DIAGNOSIS — K58.2 IRRITABLE BOWEL SYNDROME WITH BOTH CONSTIPATION AND DIARRHEA: Primary | ICD-10-CM

## 2025-06-03 RX ORDER — MELOXICAM 15 MG/1
15 TABLET ORAL DAILY
Qty: 30 TABLET | Refills: 11 | Status: SHIPPED | OUTPATIENT
Start: 2025-06-03 | End: 2026-06-03

## 2025-06-12 ENCOUNTER — OFFICE VISIT (OUTPATIENT)
Dept: PRIMARY CARE | Facility: CLINIC | Age: 40
End: 2025-06-12
Payer: COMMERCIAL

## 2025-06-12 VITALS
OXYGEN SATURATION: 96 % | WEIGHT: 152 LBS | HEART RATE: 75 BPM | BODY MASS INDEX: 18.9 KG/M2 | DIASTOLIC BLOOD PRESSURE: 80 MMHG | SYSTOLIC BLOOD PRESSURE: 116 MMHG | HEIGHT: 75 IN
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
## 2025-06-12 DIAGNOSIS — E86.0 DEHYDRATION: ICD-10-CM
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
## 2025-06-12 PROCEDURE — 1036F TOBACCO NON-USER: CPT

## 2025-06-12 PROCEDURE — 3008F BODY MASS INDEX DOCD: CPT

## 2025-06-12 PROCEDURE — 99213 OFFICE O/P EST LOW 20 MIN: CPT

## 2025-06-12 ASSESSMENT — ENCOUNTER SYMPTOMS
LOSS OF CONSCIOUSNESS: 1
FEVER: 0
NUMBNESS: 0
NAUSEA: 1
VOMITING: 0
VISUAL CHANGE: 0
BOWEL INCONTINENCE: 0
HEADACHES: 0
ABDOMINAL PAIN: 0

## 2025-06-12 ASSESSMENT — PATIENT HEALTH QUESTIONNAIRE - PHQ9
1. LITTLE INTEREST OR PLEASURE IN DOING THINGS: NOT AT ALL
2. FEELING DOWN, DEPRESSED OR HOPELESS: NOT AT ALL
SUM OF ALL RESPONSES TO PHQ9 QUESTIONS 1 AND 2: 0

## 2025-06-12 ASSESSMENT — VISUAL ACUITY: OU: 1

## 2025-06-12 NOTE — PROGRESS NOTES
"Subjective   Patient ID: Brandin Spann is a 40 y.o. male who presents for Fall (BP went low and fell at home in bathroom last night, got hot, sweaty, was nauseous, wife found him on the floor, hit his head, has cuts in his face, Left eye was red /Readings at home right after blacking out and falling/1st-90/55/2nd-89/53/3rd-96/62/4th-98/56 (15 min after eating scrambled eggs)/Wants to know if vaping affects his vaping/Only had one bottle of water all day, outside doing yard work all day, had two beers).    Fall  Incident onset: yesterday evening. Fall occurred: while returning from the bathroom fell in bedroom, got lightheaded and next thing he knew his wife was trying to wake him. He landed on Carpet (maybe the end of the bed). The point of impact was the face. The pain is present in the neck. The pain is at a severity of 2/10. The pain is mild. Associated symptoms include a loss of consciousness and nausea. Pertinent negatives include no abdominal pain, bowel incontinence, fever, headaches, hearing loss, numbness, visual change or vomiting. He has tried rest for the symptoms. The treatment provided significant relief.        Review of Systems   Constitutional:  Negative for fever.   Gastrointestinal:  Positive for nausea. Negative for abdominal pain, bowel incontinence and vomiting.   Neurological:  Positive for loss of consciousness. Negative for numbness and headaches.       Objective   /80   Pulse 75   Ht 1.905 m (6' 3\")   Wt 68.9 kg (152 lb)   SpO2 96%   BMI 19.00 kg/m²     Physical Exam  Constitutional:       General: He is not in acute distress.     Appearance: Normal appearance. He is not ill-appearing.   HENT:      Head: Laceration present. No raccoon eyes, Lerner's sign, contusion, right periorbital erythema or left periorbital erythema.     Eyes:      General: Vision grossly intact. Gaze aligned appropriately. No visual field deficit.     Extraocular Movements: Extraocular movements intact. "      Right eye: Normal extraocular motion and no nystagmus.      Left eye: Normal extraocular motion and no nystagmus.   Skin:     Findings: Wound present.   Neurological:      Mental Status: He is alert.      Cranial Nerves: No dysarthria or facial asymmetry.      Motor: No weakness or tremor.      Coordination: Coordination normal. Finger-Nose-Finger Test normal.      Gait: Gait normal.         Assessment/Plan   Brandin was seen today for fall.  Diagnoses and all orders for this visit:  Fall, initial encounter  Dehydration  Comments:  educated on need to ensure proper hydration due to working out in the elements, addition of electroylte packets with every other water  Moderate sun exposure, initial encounter

## 2025-06-26 ENCOUNTER — PATIENT MESSAGE (OUTPATIENT)
Dept: PRIMARY CARE | Facility: CLINIC | Age: 40
End: 2025-06-26

## 2025-06-26 DIAGNOSIS — F90.0 ADHD, PREDOMINANTLY INATTENTIVE TYPE: ICD-10-CM

## 2025-06-27 RX ORDER — DEXTROAMPHETAMINE SACCHARATE, AMPHETAMINE ASPARTATE, DEXTROAMPHETAMINE SULFATE AND AMPHETAMINE SULFATE 5; 5; 5; 5 MG/1; MG/1; MG/1; MG/1
20 TABLET ORAL DAILY
Qty: 30 TABLET | Refills: 0 | Status: SHIPPED | OUTPATIENT
Start: 2025-06-27 | End: 2025-07-27

## 2025-07-29 DIAGNOSIS — F90.0 ADHD, PREDOMINANTLY INATTENTIVE TYPE: ICD-10-CM

## 2025-07-29 RX ORDER — DEXTROAMPHETAMINE SACCHARATE, AMPHETAMINE ASPARTATE, DEXTROAMPHETAMINE SULFATE AND AMPHETAMINE SULFATE 5; 5; 5; 5 MG/1; MG/1; MG/1; MG/1
20 TABLET ORAL DAILY
Qty: 30 TABLET | Refills: 0 | OUTPATIENT
Start: 2025-07-29 | End: 2025-08-28

## 2025-07-29 RX ORDER — DEXTROAMPHETAMINE SACCHARATE, AMPHETAMINE ASPARTATE, DEXTROAMPHETAMINE SULFATE AND AMPHETAMINE SULFATE 5; 5; 5; 5 MG/1; MG/1; MG/1; MG/1
20 TABLET ORAL DAILY
Qty: 30 TABLET | Refills: 0 | Status: SHIPPED | OUTPATIENT
Start: 2025-07-29 | End: 2025-08-28

## 2025-07-29 NOTE — TELEPHONE ENCOUNTER
OARRS:  No data recorded  I have personally reviewed the OARRS report for Brandin Spann. I have considered the risks of abuse, dependence, addiction and diversion and I believe that it is clinically appropriate for Brandin Spann to be prescribed this medication    Is the patient prescribed a combination of a benzodiazepine and opioid?  No    Last Urine Drug Screen / ordered today: No Date of Last UDS: aug 2024  Results are as expected.

## 2025-08-13 ENCOUNTER — APPOINTMENT (OUTPATIENT)
Dept: PRIMARY CARE | Facility: CLINIC | Age: 40
End: 2025-08-13
Payer: COMMERCIAL

## 2025-08-13 VITALS
OXYGEN SATURATION: 96 % | SYSTOLIC BLOOD PRESSURE: 118 MMHG | HEART RATE: 71 BPM | HEIGHT: 75 IN | BODY MASS INDEX: 18.4 KG/M2 | DIASTOLIC BLOOD PRESSURE: 79 MMHG | WEIGHT: 148 LBS

## 2025-08-13 DIAGNOSIS — F90.0 ADHD, PREDOMINANTLY INATTENTIVE TYPE: ICD-10-CM

## 2025-08-13 DIAGNOSIS — Z13.220 SCREENING FOR LIPOID DISORDERS: ICD-10-CM

## 2025-08-13 DIAGNOSIS — Z02.89 MEDICATION MANAGEMENT CONTRACT SIGNED: ICD-10-CM

## 2025-08-13 DIAGNOSIS — Z13.29 SCREENING FOR THYROID DISORDER: ICD-10-CM

## 2025-08-13 DIAGNOSIS — Z13.1 DIABETES MELLITUS SCREENING: ICD-10-CM

## 2025-08-13 DIAGNOSIS — Z00.00 WELLNESS EXAMINATION: ICD-10-CM

## 2025-08-13 DIAGNOSIS — Z79.899 HIGH RISK MEDICATION USE: ICD-10-CM

## 2025-08-13 DIAGNOSIS — Z13.220 LIPID SCREENING: ICD-10-CM

## 2025-08-13 DIAGNOSIS — Z13.0 SCREENING FOR DEFICIENCY ANEMIA: Primary | ICD-10-CM

## 2025-08-13 DIAGNOSIS — E55.9 VITAMIN D DEFICIENCY: ICD-10-CM

## 2025-08-13 LAB
POC AMPHETAMINE: NEGATIVE NG/ML
POC BARBITURATES: NEGATIVE NG/ML
POC BENZODIAZEPINES: NEGATIVE NG/ML
POC BUPRENORPHINE URINE: NEGATIVE NG/ML
POC COCAINE: NEGATIVE NG/ML
POC MDMA (NG/ML) IN URINE: NEGATIVE NG/ML
POC METHADONE MANUALLY ENTERED: NEGATIVE NG/ML
POC METHAMPHETAMINE: NEGATIVE NG/ML
POC MORPHINE URINE: NEGATIVE NG/ML
POC OPIATES: NEGATIVE NG/ML
POC OXYCODONE: NEGATIVE NG/ML
POC PHENCYCLIDINE (PCP): NEGATIVE NG/ML
POC THC: POSITIVE NG/ML
POC TICYCLIC ANTIDEPRESSANTS (TCA): NEGATIVE NG/ML

## 2025-08-13 PROCEDURE — 80305 DRUG TEST PRSMV DIR OPT OBS: CPT

## 2025-08-13 PROCEDURE — 99396 PREV VISIT EST AGE 40-64: CPT

## 2025-08-13 PROCEDURE — 99213 OFFICE O/P EST LOW 20 MIN: CPT

## 2025-08-13 PROCEDURE — 1036F TOBACCO NON-USER: CPT

## 2025-08-13 PROCEDURE — 3008F BODY MASS INDEX DOCD: CPT

## 2025-08-13 RX ORDER — DEXTROAMPHETAMINE SACCHARATE, AMPHETAMINE ASPARTATE, DEXTROAMPHETAMINE SULFATE AND AMPHETAMINE SULFATE 5; 5; 5; 5 MG/1; MG/1; MG/1; MG/1
20 TABLET ORAL DAILY
Qty: 30 TABLET | Refills: 0 | Status: SHIPPED | OUTPATIENT
Start: 2025-09-12 | End: 2025-10-12

## 2025-08-13 RX ORDER — DEXTROAMPHETAMINE SACCHARATE, AMPHETAMINE ASPARTATE, DEXTROAMPHETAMINE SULFATE AND AMPHETAMINE SULFATE 5; 5; 5; 5 MG/1; MG/1; MG/1; MG/1
20 TABLET ORAL DAILY
Qty: 30 TABLET | Refills: 0 | Status: SHIPPED | OUTPATIENT
Start: 2025-08-13 | End: 2025-09-12

## 2025-08-13 RX ORDER — DEXTROAMPHETAMINE SACCHARATE, AMPHETAMINE ASPARTATE, DEXTROAMPHETAMINE SULFATE AND AMPHETAMINE SULFATE 5; 5; 5; 5 MG/1; MG/1; MG/1; MG/1
20 TABLET ORAL DAILY
Qty: 30 TABLET | Refills: 0 | Status: SHIPPED | OUTPATIENT
Start: 2025-10-12 | End: 2025-11-11

## 2025-08-13 ASSESSMENT — ENCOUNTER SYMPTOMS
SHORTNESS OF BREATH: 0
SLEEP DISTURBANCE: 0
MYALGIAS: 0
CHANGE IN BOWEL HABIT: 0
CHEST TIGHTNESS: 0
HEADACHES: 0
SPEECH DIFFICULTY: 0
WEAKNESS: 0
DIARRHEA: 0
DIZZINESS: 0
NECK PAIN: 0
DIFFICULTY URINATING: 0
EYE DISCHARGE: 0
HEMATURIA: 0
VOICE CHANGE: 0
JOINT SWELLING: 0
CONSTIPATION: 0
AGITATION: 0
VOMITING: 0
ABDOMINAL PAIN: 0
SINUS PAIN: 0
DEPRESSION: 0
NUMBNESS: 0
WHEEZING: 0
HYPERACTIVE: 0
SINUS PRESSURE: 0
BLOOD IN STOOL: 0
COUGH: 0
SWOLLEN GLANDS: 0
EYE PAIN: 0
NERVOUS/ANXIOUS: 0
FEVER: 0
SORE THROAT: 0
RHINORRHEA: 0
FATIGUE: 0
FREQUENCY: 0
WOUND: 0
LOSS OF SENSATION IN FEET: 0
OCCASIONAL FEELINGS OF UNSTEADINESS: 0
DYSPHORIC MOOD: 0
BACK PAIN: 0
EYE ITCHING: 0
PALPITATIONS: 0

## 2025-08-14 LAB
25(OH)D3+25(OH)D2 SERPL-MCNC: 43 NG/ML (ref 30–100)
ALBUMIN SERPL-MCNC: 4.7 G/DL (ref 3.6–5.1)
ALP SERPL-CCNC: 59 U/L (ref 36–130)
ALT SERPL-CCNC: 20 U/L (ref 9–46)
ANION GAP SERPL CALCULATED.4IONS-SCNC: 10 MMOL/L (CALC) (ref 7–17)
AST SERPL-CCNC: 20 U/L (ref 10–40)
BILIRUB SERPL-MCNC: 0.9 MG/DL (ref 0.2–1.2)
BUN SERPL-MCNC: 17 MG/DL (ref 7–25)
CALCIUM SERPL-MCNC: 9.6 MG/DL (ref 8.6–10.3)
CHLORIDE SERPL-SCNC: 100 MMOL/L (ref 98–110)
CHOLEST SERPL-MCNC: 142 MG/DL
CHOLEST/HDLC SERPL: 2.4 (CALC)
CO2 SERPL-SCNC: 31 MMOL/L (ref 20–32)
CREAT SERPL-MCNC: 0.96 MG/DL (ref 0.6–1.29)
EGFRCR SERPLBLD CKD-EPI 2021: 101 ML/MIN/1.73M2
ERYTHROCYTE [DISTWIDTH] IN BLOOD BY AUTOMATED COUNT: 13.3 % (ref 11–15)
GLUCOSE SERPL-MCNC: 78 MG/DL (ref 65–139)
HCT VFR BLD AUTO: 48.8 % (ref 38.5–50)
HDLC SERPL-MCNC: 58 MG/DL
HGB BLD-MCNC: 15.7 G/DL (ref 13.2–17.1)
LDLC SERPL CALC-MCNC: 63 MG/DL (CALC)
MCH RBC QN AUTO: 32.4 PG (ref 27–33)
MCHC RBC AUTO-ENTMCNC: 32.2 G/DL (ref 32–36)
MCV RBC AUTO: 100.8 FL (ref 80–100)
NONHDLC SERPL-MCNC: 84 MG/DL (CALC)
PLATELET # BLD AUTO: 202 THOUSAND/UL (ref 140–400)
PMV BLD REES-ECKER: 11 FL (ref 7.5–12.5)
POTASSIUM SERPL-SCNC: 3.9 MMOL/L (ref 3.5–5.3)
PROT SERPL-MCNC: 7.1 G/DL (ref 6.1–8.1)
RBC # BLD AUTO: 4.84 MILLION/UL (ref 4.2–5.8)
SODIUM SERPL-SCNC: 141 MMOL/L (ref 135–146)
TRIGL SERPL-MCNC: 126 MG/DL
TSH SERPL-ACNC: 1.35 MIU/L (ref 0.4–4.5)
WBC # BLD AUTO: 10.5 THOUSAND/UL (ref 3.8–10.8)

## 2026-02-11 ENCOUNTER — APPOINTMENT (OUTPATIENT)
Dept: PRIMARY CARE | Facility: CLINIC | Age: 41
End: 2026-02-11
Payer: COMMERCIAL